# Patient Record
Sex: FEMALE | Race: WHITE | NOT HISPANIC OR LATINO | Employment: STUDENT | URBAN - METROPOLITAN AREA
[De-identification: names, ages, dates, MRNs, and addresses within clinical notes are randomized per-mention and may not be internally consistent; named-entity substitution may affect disease eponyms.]

---
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
## 2025-02-24 ENCOUNTER — TELEPHONE (OUTPATIENT)
Dept: OBSTETRICS AND GYNECOLOGY | Facility: CLINIC | Age: 20
End: 2025-02-24
Payer: COMMERCIAL

## 2025-02-24 NOTE — TELEPHONE ENCOUNTER
Pt mom calling, patient was referred to our office from her pediatrician Dr. Morgan. Patient complaining of lump on her right breast that has been there about 2 months. Patient mom states it is the size of a grape and is sore. Patient is away at college and will be home on the 7th. Appt scheduled

## 2025-03-05 NOTE — PROGRESS NOTES
GYNECOLOGY OFFICE VISIT   Subjective     History Of Present Illness:    Tereza Marr is a 19 y.o.  presenting for breast lump.    She first noticed a lump in her right breast about a month ago that she describes as a hard, elongated, that she describes as [id number] in length. She endorses associated pain, worse on her period. She does not think it has changed in size over the past month. She denies associated skin changes, drainage, discharge. She has not tried any medication for this.    She endorses her periods as regular. She is not currently taking anything for contraception.    Denies family history of breast, colon, vulvar, vaginal, cervical, uterine, or ovarian cancer.    Notably, accompanied by mother to today's visit.    Review of Systems:  Denies abdominal pain, pelvic pain, nausea, vomiting, urinary frequency, dysuria, hematuria, constipation, hematochezia, abnormal bleeding, abnormal vaginal discharge, or dyspareunia.     Menstrual History:  OB History          0    Para   0    Term   0       0    AB   0    Living   0         SAB   0    IAB   0    Ectopic   0    Multiple   0    Live Births   0                Menarche age: 13    Patient's last menstrual period was 2025 (exact date).     Past Medical History:  Past Medical History:   Diagnosis Date    Anxiety     Constipation     Eczema      Past Surgical History:  Past Surgical History:   Procedure Laterality Date    OTHER SURGICAL HISTORY  2022    No history of surgery     Social History:  Social History     Tobacco Use    Smoking status: Never    Smokeless tobacco: Never   Substance Use Topics    Alcohol use: Not Currently     Family History:  No family history on file.     Allergies:  Azithromycin    Outpatient Medications:  Current Outpatient Medications   Medication Instructions    loratadine (Claritin) 10 mg tablet oral     Objective   Last Recorded Vitals:  Visit Vitals  /74     Physical Exam:  General:  Alert and oriented, in no acute distress.  Psych: Normal affect and mood. Able to answer questions appropriately.   Breast/Axilla: Right breast with firm elongated mass like structure in the right upper quadrant, not tender, no overlying skin changes. Left breast without masses, skin changes, or nipple discharge. No palpable supraclavicular or axillary lymphadenopathy.   Heart: Regular rate.  Lungs: Non labored respirations.  Abdomen: Soft, non-tender, without masses or organomegaly.    Assessment/Plan     19 y.o.  , here for right breast lump  Assessment & Plan  Mass of breast, unspecified laterality  -Patient with right breast lump x1 month as detailed above  -Discussed symptom relief with Tylenol, Motrin, heating pads as needed  -Discussed obtaining breast US for further evaluation  -Patient to follow up after imaging and her and her mother are in agreement with this plan of care    Orders:    BI US breast limited right; Future      Justyna Shay MD

## 2025-03-07 ENCOUNTER — OFFICE VISIT (OUTPATIENT)
Dept: OBSTETRICS AND GYNECOLOGY | Facility: CLINIC | Age: 20
End: 2025-03-07
Payer: COMMERCIAL

## 2025-03-07 VITALS — BODY MASS INDEX: 24.86 KG/M2 | SYSTOLIC BLOOD PRESSURE: 106 MMHG | DIASTOLIC BLOOD PRESSURE: 74 MMHG | WEIGHT: 134.8 LBS

## 2025-03-07 DIAGNOSIS — N63.0 MASS OF BREAST, UNSPECIFIED LATERALITY: Primary | ICD-10-CM

## 2025-03-07 PROCEDURE — 99212 OFFICE O/P EST SF 10 MIN: CPT | Performed by: STUDENT IN AN ORGANIZED HEALTH CARE EDUCATION/TRAINING PROGRAM

## 2025-03-07 PROCEDURE — 1036F TOBACCO NON-USER: CPT | Performed by: STUDENT IN AN ORGANIZED HEALTH CARE EDUCATION/TRAINING PROGRAM

## 2025-03-07 PROCEDURE — 99202 OFFICE O/P NEW SF 15 MIN: CPT | Performed by: STUDENT IN AN ORGANIZED HEALTH CARE EDUCATION/TRAINING PROGRAM

## 2025-03-07 ASSESSMENT — PATIENT HEALTH QUESTIONNAIRE - PHQ9
2. FEELING DOWN, DEPRESSED OR HOPELESS: NOT AT ALL
SUM OF ALL RESPONSES TO PHQ9 QUESTIONS 1 & 2: 0
1. LITTLE INTEREST OR PLEASURE IN DOING THINGS: NOT AT ALL
1. LITTLE INTEREST OR PLEASURE IN DOING THINGS: NOT AT ALL
2. FEELING DOWN, DEPRESSED OR HOPELESS: NOT AT ALL
SUM OF ALL RESPONSES TO PHQ9 QUESTIONS 1 & 2: 0

## 2025-03-07 ASSESSMENT — ENCOUNTER SYMPTOMS
LOSS OF SENSATION IN FEET: 0
DEPRESSION: 0
OCCASIONAL FEELINGS OF UNSTEADINESS: 0

## 2025-03-07 ASSESSMENT — LIFESTYLE VARIABLES
AUDIT-C TOTAL SCORE: 2
SKIP TO QUESTIONS 9-10: 0
HOW OFTEN DO YOU HAVE SIX OR MORE DRINKS ON ONE OCCASION: LESS THAN MONTHLY
HOW MANY STANDARD DRINKS CONTAINING ALCOHOL DO YOU HAVE ON A TYPICAL DAY: 1 OR 2
HOW OFTEN DO YOU HAVE A DRINK CONTAINING ALCOHOL: MONTHLY OR LESS

## 2025-03-07 ASSESSMENT — PAIN SCALES - GENERAL: PAINLEVEL_OUTOF10: 0-NO PAIN

## 2025-03-14 ENCOUNTER — HOSPITAL ENCOUNTER (OUTPATIENT)
Dept: RADIOLOGY | Facility: HOSPITAL | Age: 20
Discharge: HOME | End: 2025-03-14
Payer: COMMERCIAL

## 2025-03-14 ENCOUNTER — APPOINTMENT (OUTPATIENT)
Dept: RADIOLOGY | Facility: HOSPITAL | Age: 20
End: 2025-03-14
Payer: COMMERCIAL

## 2025-03-14 DIAGNOSIS — N63.0 MASS OF BREAST, UNSPECIFIED LATERALITY: ICD-10-CM

## 2025-03-14 PROCEDURE — 76642 ULTRASOUND BREAST LIMITED: CPT | Mod: RT

## 2025-07-11 ENCOUNTER — APPOINTMENT (OUTPATIENT)
Age: 20
End: 2025-07-11
Payer: COMMERCIAL

## 2025-07-11 VITALS
BODY MASS INDEX: 24.18 KG/M2 | SYSTOLIC BLOOD PRESSURE: 104 MMHG | DIASTOLIC BLOOD PRESSURE: 62 MMHG | WEIGHT: 131.4 LBS | HEART RATE: 92 BPM | HEIGHT: 62 IN

## 2025-07-11 DIAGNOSIS — Z00.00 ENCOUNTER FOR GENERAL ADULT MEDICAL EXAMINATION W/O ABNORMAL FINDINGS: Primary | ICD-10-CM

## 2025-07-11 DIAGNOSIS — N94.6 DYSMENORRHEA IN THE ADOLESCENT: ICD-10-CM

## 2025-07-11 PROCEDURE — 3008F BODY MASS INDEX DOCD: CPT | Performed by: PEDIATRICS

## 2025-07-11 PROCEDURE — 99395 PREV VISIT EST AGE 18-39: CPT | Performed by: PEDIATRICS

## 2025-07-11 PROCEDURE — 96127 BRIEF EMOTIONAL/BEHAV ASSMT: CPT | Performed by: PEDIATRICS

## 2025-07-11 PROCEDURE — 1036F TOBACCO NON-USER: CPT | Performed by: PEDIATRICS

## 2025-07-11 ASSESSMENT — PATIENT HEALTH QUESTIONNAIRE - PHQ9
2. FEELING DOWN, DEPRESSED OR HOPELESS: NOT AT ALL
SUM OF ALL RESPONSES TO PHQ9 QUESTIONS 1 & 2: 0
7. TROUBLE CONCENTRATING ON THINGS, SUCH AS READING THE NEWSPAPER OR WATCHING TELEVISION: NOT AT ALL
5. POOR APPETITE OR OVEREATING: NOT AT ALL
9. THOUGHTS THAT YOU WOULD BE BETTER OFF DEAD, OR OF HURTING YOURSELF: NOT AT ALL
5. POOR APPETITE OR OVEREATING: NOT AT ALL
3. TROUBLE FALLING OR STAYING ASLEEP OR SLEEPING TOO MUCH: NOT AT ALL
SUM OF ALL RESPONSES TO PHQ QUESTIONS 1-9: 0
6. FEELING BAD ABOUT YOURSELF - OR THAT YOU ARE A FAILURE OR HAVE LET YOURSELF OR YOUR FAMILY DOWN: NOT AT ALL
2. FEELING DOWN, DEPRESSED OR HOPELESS: NOT AT ALL
4. FEELING TIRED OR HAVING LITTLE ENERGY: NOT AT ALL
8. MOVING OR SPEAKING SO SLOWLY THAT OTHER PEOPLE COULD HAVE NOTICED. OR THE OPPOSITE - BEING SO FIDGETY OR RESTLESS THAT YOU HAVE BEEN MOVING AROUND A LOT MORE THAN USUAL: NOT AT ALL
3. TROUBLE FALLING OR STAYING ASLEEP: NOT AT ALL
1. LITTLE INTEREST OR PLEASURE IN DOING THINGS: NOT AT ALL
10. IF YOU CHECKED OFF ANY PROBLEMS, HOW DIFFICULT HAVE THESE PROBLEMS MADE IT FOR YOU TO DO YOUR WORK, TAKE CARE OF THINGS AT HOME, OR GET ALONG WITH OTHER PEOPLE: NOT DIFFICULT AT ALL
10. IF YOU CHECKED OFF ANY PROBLEMS, HOW DIFFICULT HAVE THESE PROBLEMS MADE IT FOR YOU TO DO YOUR WORK, TAKE CARE OF THINGS AT HOME, OR GET ALONG WITH OTHER PEOPLE: NOT DIFFICULT AT ALL
6. FEELING BAD ABOUT YOURSELF - OR THAT YOU ARE A FAILURE OR HAVE LET YOURSELF OR YOUR FAMILY DOWN: NOT AT ALL
7. TROUBLE CONCENTRATING ON THINGS, SUCH AS READING THE NEWSPAPER OR WATCHING TELEVISION: NOT AT ALL
1. LITTLE INTEREST OR PLEASURE IN DOING THINGS: NOT AT ALL
9. THOUGHTS THAT YOU WOULD BE BETTER OFF DEAD, OR OF HURTING YOURSELF: NOT AT ALL
8. MOVING OR SPEAKING SO SLOWLY THAT OTHER PEOPLE COULD HAVE NOTICED. OR THE OPPOSITE, BEING SO FIGETY OR RESTLESS THAT YOU HAVE BEEN MOVING AROUND A LOT MORE THAN USUAL: NOT AT ALL
4. FEELING TIRED OR HAVING LITTLE ENERGY: NOT AT ALL

## 2025-07-11 ASSESSMENT — PAIN SCALES - GENERAL: PAINLEVEL_OUTOF10: 0-NO PAIN

## 2025-07-11 NOTE — PROGRESS NOTES
"Subjective   History was provided by the mother.  Tereza Marr is a 19 y.o. female who is here for this well-child visit.    Concerns: HPV,     School: [employer], education major  Activities: likes to ride her bike, recognizing that exercise helps her anxiety    Diet: eats well, some dairy  Puberty: menses are regular, painful menses, taking NSAIDS too frequently, discussed importance of proper spacing to prevent GI side effects    PHQ9: reviewed and 0-4, no depression    Anticipatory Guidance:  discussed nutrition and exercise, recommend annual flu vaccine, menstrual cycles discussed, and HPV vaccine discussed    /62 (BP Location: Left arm, Patient Position: Sitting, BP Cuff Size: Adult)   Pulse 92   Ht 1.581 m (5' 2.24\")   Wt 59.6 kg (131 lb 6.4 oz)   BMI 23.85 kg/m²   Vision Screening    Right eye Left eye Both eyes   Without correction      With correction   Wears Contacts; Sees Eye Doctor       General:  Well appearing   Eyes:   Sclera clear   Mouth: Mucous membranes moist, lips, teeth, gums normal   Throat clear   Ears: Tympanic membranes normal   Heart Regular rate and rhythm, no murmurs   Lungs clear   Abdomen:  soft, non-tender, no masses, no organomegaly   Back:  No scoliosis   Musculoskeletal: Normal muscle bulk and tone   Skin: No rash     Assessment and Plan:    1. Encounter for general adult medical examination w/o abnormal findings  Hemoglobin A1C    Lipid Panel    Vitamin D 25-Hydroxy,Total (for eval of Vitamin D levels)    TSH with reflex to Free T4 if abnormal    Ferritin    CBC and Auto Differential    Hemoglobin A1C    Lipid Panel    Vitamin D 25-Hydroxy,Total (for eval of Vitamin D levels)    TSH with reflex to Free T4 if abnormal    Ferritin    CBC and Auto Differential    doing well, healthy BMI, encouraged HPV vaccine in the future      2. Dysmenorrhea in the adolescent      discussed importance of proper spacing of OTC pain medications          Follow up for next well child " exam in 1 year

## 2025-07-11 NOTE — PATIENT INSTRUCTIONS
1. Encounter for general adult medical examination w/o abnormal findings  Hemoglobin A1C    Lipid Panel    Vitamin D 25-Hydroxy,Total (for eval of Vitamin D levels)    TSH with reflex to Free T4 if abnormal    Ferritin    CBC and Auto Differential    Hemoglobin A1C    Lipid Panel    Vitamin D 25-Hydroxy,Total (for eval of Vitamin D levels)    TSH with reflex to Free T4 if abnormal    Ferritin    CBC and Auto Differential    doing well, healthy BMI, encouraged HPV vaccine in the future      2. Dysmenorrhea in the adolescent      discussed importance of proper spacing of OTC pain medications